# Patient Record
Sex: MALE | Race: WHITE | Employment: OTHER | URBAN - METROPOLITAN AREA
[De-identification: names, ages, dates, MRNs, and addresses within clinical notes are randomized per-mention and may not be internally consistent; named-entity substitution may affect disease eponyms.]

---

## 2018-08-22 PROBLEM — R97.20 ELEVATED PSA MEASUREMENT: Status: ACTIVE | Noted: 2017-10-25

## 2018-08-22 PROBLEM — E78.2 MIXED HYPERLIPIDEMIA: Status: ACTIVE | Noted: 2017-10-25

## 2018-08-24 PROCEDURE — 86803 HEPATITIS C AB TEST: CPT | Performed by: FAMILY MEDICINE

## 2018-08-24 PROCEDURE — 87389 HIV-1 AG W/HIV-1&-2 AB AG IA: CPT | Performed by: FAMILY MEDICINE

## 2018-09-08 PROBLEM — I77.9 ARTERIAL DISEASE (HCC): Status: ACTIVE | Noted: 2018-09-08

## 2018-09-08 PROBLEM — M46.85: Status: ACTIVE | Noted: 2018-09-08

## 2018-09-26 PROBLEM — Z87.898 HISTORY OF SEIZURE: Status: ACTIVE | Noted: 2018-09-26

## 2020-09-18 PROBLEM — M48.8X4: Status: ACTIVE | Noted: 2020-09-18

## 2020-09-18 PROBLEM — M48.8X4: Status: RESOLVED | Noted: 2020-09-18 | Resolved: 2020-09-18

## 2020-09-18 PROBLEM — G31.9 CEREBRAL ATROPHY (HCC): Status: ACTIVE | Noted: 2020-09-18

## 2020-09-21 PROBLEM — M46.85: Status: RESOLVED | Noted: 2018-09-08 | Resolved: 2020-09-21

## 2020-11-15 ENCOUNTER — HOSPITAL ENCOUNTER (INPATIENT)
Facility: HOSPITAL | Age: 80
LOS: 2 days | Discharge: HOME HEALTH | DRG: 683 | End: 2020-11-17
Attending: EMERGENCY MEDICINE | Admitting: HOSPITALIST
Payer: MEDICARE

## 2020-11-15 ENCOUNTER — APPOINTMENT (OUTPATIENT)
Dept: CT IMAGING | Facility: HOSPITAL | Age: 80
DRG: 683 | End: 2020-11-15
Attending: EMERGENCY MEDICINE
Payer: MEDICARE

## 2020-11-15 DIAGNOSIS — N17.9 ACUTE RENAL FAILURE, UNSPECIFIED ACUTE RENAL FAILURE TYPE (HCC): Primary | ICD-10-CM

## 2020-11-15 DIAGNOSIS — N13.9 URINARY OBSTRUCTION: ICD-10-CM

## 2020-11-15 DIAGNOSIS — E87.5 HYPERKALEMIA: ICD-10-CM

## 2020-11-15 PROCEDURE — 99223 1ST HOSP IP/OBS HIGH 75: CPT | Performed by: HOSPITALIST

## 2020-11-15 PROCEDURE — 99223 1ST HOSP IP/OBS HIGH 75: CPT | Performed by: INTERNAL MEDICINE

## 2020-11-15 PROCEDURE — 74176 CT ABD & PELVIS W/O CONTRAST: CPT | Performed by: EMERGENCY MEDICINE

## 2020-11-15 RX ORDER — METOCLOPRAMIDE HYDROCHLORIDE 5 MG/ML
5 INJECTION INTRAMUSCULAR; INTRAVENOUS EVERY 8 HOURS PRN
Status: DISCONTINUED | OUTPATIENT
Start: 2020-11-15 | End: 2020-11-17

## 2020-11-15 RX ORDER — ACETAMINOPHEN 325 MG/1
650 TABLET ORAL EVERY 6 HOURS PRN
Status: DISCONTINUED | OUTPATIENT
Start: 2020-11-15 | End: 2020-11-17

## 2020-11-15 RX ORDER — SODIUM CHLORIDE 9 MG/ML
125 INJECTION, SOLUTION INTRAVENOUS CONTINUOUS
Status: DISCONTINUED | OUTPATIENT
Start: 2020-11-15 | End: 2020-11-15

## 2020-11-15 RX ORDER — SODIUM CHLORIDE 450 MG/100ML
INJECTION, SOLUTION INTRAVENOUS CONTINUOUS
Status: DISCONTINUED | OUTPATIENT
Start: 2020-11-15 | End: 2020-11-17

## 2020-11-15 RX ORDER — DEXTROSE MONOHYDRATE 25 G/50ML
50 INJECTION, SOLUTION INTRAVENOUS ONCE
Status: COMPLETED | OUTPATIENT
Start: 2020-11-15 | End: 2020-11-15

## 2020-11-15 RX ORDER — SODIUM CHLORIDE 9 MG/ML
INJECTION, SOLUTION INTRAVENOUS CONTINUOUS
Status: DISCONTINUED | OUTPATIENT
Start: 2020-11-15 | End: 2020-11-15

## 2020-11-15 RX ORDER — POLYETHYLENE GLYCOL 3350 17 G/17G
17 POWDER, FOR SOLUTION ORAL DAILY PRN
Status: DISCONTINUED | OUTPATIENT
Start: 2020-11-15 | End: 2020-11-17

## 2020-11-15 RX ORDER — ONDANSETRON 2 MG/ML
4 INJECTION INTRAMUSCULAR; INTRAVENOUS EVERY 6 HOURS PRN
Status: DISCONTINUED | OUTPATIENT
Start: 2020-11-15 | End: 2020-11-17

## 2020-11-15 RX ORDER — SODIUM POLYSTYRENE SULFONATE 4.1 MEQ/G
15 POWDER, FOR SUSPENSION ORAL; RECTAL ONCE
Status: COMPLETED | OUTPATIENT
Start: 2020-11-15 | End: 2020-11-15

## 2020-11-15 RX ORDER — TAMSULOSIN HYDROCHLORIDE 0.4 MG/1
0.4 CAPSULE ORAL DAILY
Status: DISCONTINUED | OUTPATIENT
Start: 2020-11-15 | End: 2020-11-17

## 2020-11-15 RX ORDER — HEPARIN SODIUM 5000 [USP'U]/ML
5000 INJECTION, SOLUTION INTRAVENOUS; SUBCUTANEOUS EVERY 12 HOURS SCHEDULED
Status: DISCONTINUED | OUTPATIENT
Start: 2020-11-15 | End: 2020-11-17

## 2020-11-15 NOTE — ED PROVIDER NOTES
Patient Seen in: [facility] Emergency Department      History   Patient presents with:  Constipation  Abdomen/Flank Pain    Stated Complaint: constipation, abdominal pain    HPI    [facility] PEPE is a pleasant 78-year-old male coming with complaints of abdom °C) (Temporal)   Resp 15   Ht 188 cm (6' 2\")   Wt 84.4 kg   SpO2 99%   BMI 23.88 kg/m²         Physical Exam    Alert and oriented patient appears no distress HEENT exam is normal lungs are clear cardiovascular exam shows regular rhythm abdomen soft nonte Sinus Rhythm  Reading: Bifascicular block noted              A total of 35 minutes of critical care time (exclusive of billable procedures) was administered to manage the patient's critical lab values due to his acute renal failure with hyperkalemia.   This

## 2020-11-15 NOTE — CONSULTS
[facility]  Report of Consultation    Matt Moser.  Patient Status:  Inpatient    10/9/1940 MRN [id number]   [facility] 6NE-A Attending Violeta Martinez MD   Hosp Day # 0 PCP Unknown Pcp       Assessment / Plan:    1) NICHOLAS- due to [Penicillin V P*    RASH  Penicillins             RASH    Medications:    Current Facility-Administered Medications:   •  0.9% NaCl infusion, 125 mL/hr, Intravenous, Continuous  •  sodium chloride 0.9% IV bolus 1,000 mL, 1,000 mL, Intravenous, Once  •  [CO Warm and dry, no rashes      Laboratory Data:  Lab Results   Component Value Date    WBC 10.7 11/15/2020    HGB 11.7 11/15/2020    HCT 36.4 11/15/2020    .0 11/15/2020    CREATSERUM 14.30 11/15/2020     11/15/2020     11/15/2020    K 5.

## 2020-11-15 NOTE — ED INITIAL ASSESSMENT (HPI)
Patient here with c/o abdominal pain for a couple times. Patient states he is \"shut down inside. I'm trying to get my body back to normalcy. \"  Patient's last BM 3-4 days ago.

## 2020-11-15 NOTE — H&P
[facility] hospitals[facility]  History and Physical     howsimple.  Patient Status:  Inpatient    10/9/1940 MRN [id number]   [facility] 6NE-A Attending Fredis Snow MD   Hosp Day # 0 PCP Unknown Pcp     Chief Complaint: abdominal pain, co Pancreas cannot digest correctly  Pcn [Penicillin V P*    RASH  Penicillins             RASH    Medications:  No current facility-administered medications on file prior to encounter.      •  Coenzyme Q10 (COQ-10) 100 MG Oral Cap, Take by mouth., Disp: , Rfl results for input(s): PTP, INR in the last 168 hours. No results for input(s): TROP, CK in the last 168 hours. Imaging: Imaging data reviewed in Epic. ASSESSMENT / PLAN:     1. Acute renal failure, due to obstructive uropathy  1.  Luo catheter

## 2020-11-16 PROCEDURE — 99233 SBSQ HOSP IP/OBS HIGH 50: CPT | Performed by: HOSPITALIST

## 2020-11-16 PROCEDURE — 99233 SBSQ HOSP IP/OBS HIGH 50: CPT | Performed by: INTERNAL MEDICINE

## 2020-11-16 NOTE — PLAN OF CARE
Pt received alert/oriented x4, CARROLL, following all commands. Pt denies any SOB. Does c/o \"mild\" abdominal discomfort/tenderness, bloated feeling. Pt states he feels \"much better than earlier today\". Lungs clear bilaterally.  Luo catheter draining clear

## 2020-11-16 NOTE — OCCUPATIONAL THERAPY NOTE
OCCUPATIONAL THERAPY EVALUATION - INPATIENT     Room Number: 6742/7439-V  Evaluation Date: 11/16/2020  Type of Evaluation: Initial  Presenting Problem: abdominal pain, urinary obstruction, NICHOLAS    Physician Order: IP Consult to Occupational Therapy  Reason With: Alone    Toilet and Equipment: Standard height toilet          Occupation/Status: retired     Drives: Yes       Prior Level of Function: lives alone. Independent with all ADL and IADL. No assistive device.   Pt cares for his neighbor who has medical such as brushing teeth?: None  -   Eating meals?: None    AM-PAC Score:  Score: 21  Approx Degree of Impairment: 32.79%  Standardized Score (AM-PAC Scale): 44.27  CMS Modifier (G-Code): CJ    FUNCTIONAL TRANSFER ASSESSMENT  Supine to Sit : Supervision  Sit ability to return safely to his prior level of function.     Patient Complexity  Occupational Profile/Medical History LOW - Brief history including review of medical or therapy records    Specific performance deficits impacting engagement in ADL/IADL LOW  1

## 2020-11-16 NOTE — PLAN OF CARE
Received patient from ER around 1015 am. Patient is alert and oriented, forgetful at times. Able to CARROLL's. Patient on room air, lung sounds diminshed. Patient is in NSR occasional PVC's later in the shift, SBP is WNL.  Pedal pulses palpable, bilateral lower to review patient's medication profile  - Implement strategies to promote bladder emptying  Outcome: Progressing

## 2020-11-16 NOTE — PROGRESS NOTES
[facility]  Nephrology Progress Note    Rainell Console. Attending:  Johana Gilman MD       Assessment and Plan:    1) NICHOLAS- due to obstructive uropathy / urinary retention from BPH vs prostate CA (PSA 6.6)- no other acute insults.  Meds benign; no  11/16/2020    CO2 27.0 11/16/2020     11/16/2020    CA 8.6 11/16/2020    MG 2.0 11/16/2020    PHOS 3.7 11/16/2020       Imaging: All imaging studies reviewed.     Meds:     •  sodium chloride 0.9% IV bolus 1,000 mL, 1,000 mL, Intravenous,

## 2020-11-16 NOTE — PLAN OF CARE
Problem: Impaired Activities of Daily Living  Goal: Achieve highest/safest level of independence in self care  Description: Interventions:  - Assess ability and encourage patient to participate in ADLs to maximize function  - Promote sitting position [facility]

## 2020-11-16 NOTE — CONSULTS
[facility] Urology   Consultation Note    Isak Luna.  Patient Status:  Inpatient    10/9/1940 MRN [id number]   [facility] 6NE-A Attending Luda Black MD   Hosp Day # 1 PCP Unknown Pcp     Reason for Consultation:  NICHOLAS, Diagnosis Date   • BPH (benign prostatic hyperplasia)    • Retention of urine    • Spondylolysis, thoracolumbar region      Past Surgical History:   Procedure Laterality Date   • ADENOIDECTOMY  1950   • APPENDECTOMY  1949   • APPENDECTOMY     • HIP REPLA in no acute distress. HEENT: Unremarkable. NECK: Supple. LUNGS: non-labored respirations. ABDOMEN:  The abdomen is soft and nontender without rebound or guarding. The bladder is non-palpable. BACK: Without CVA tenderness.     GENITOURINARY: The imaging. RETROPERITONEUM:  No mass or adenopathy. BOWEL/MESENTERY:  Limited assessment of the bowel without intravenous or oral contrast.  No frankly dilated loops of small bowel are seen. Much of the bowel is decompressed which limits assessment.   Sm Acute renal failure (HCC)     NICHOLAS (acute kidney injury) ([facility] [address]     Hyperkalemia     Urinary obstruction      NICHOLAS, bilateral hydroureteronephrosis, urinary retention/BPH  -serum creat  -UA does not appear infectious.   +microhematuria in the setting of pacheco

## 2020-11-16 NOTE — CM/SW NOTE
11/16/20 1100   CM/SW Referral Data   Referral Source    Reason for Referral Discharge planning   Informant Patient   Patient Info   Patient's Mental Status Alert;Oriented   Patient's Home Environment Condo/Apt no elevator   Patient lives wi

## 2020-11-16 NOTE — PROGRESS NOTES
[facility]  Progress Note     Yovani Glynn. Patient Status:  Inpatient    10/9/1940 MRN [id number]   [facility] 6NE-A Attending Luiz Rivera MD   Hosp Day # 1 PCP Unknown Pcp     Chief Complaint: discomfort from catheter. (based on SCr of 4.07 mg/dL (H)). No results for input(s): PTP, INR in the last 168 hours. No results for input(s): TROP, CK in the last 168 hours. Imaging: Imaging data reviewed in Epic.     Medications:   • sodium chloride 0.9%  1,000 mL Int

## 2020-11-16 NOTE — HOME CARE LIAISON
Received referral from [facility]. [facility]idential unable to service patient. Will re refer patient. Patient accepted by Steven Fritz. SW updated.     [facility]  [address] [facility] [address]  Phone: [phone]  Fax: [phone]

## 2020-11-16 NOTE — PHYSICAL THERAPY NOTE
PHYSICAL THERAPY EVALUATION - INPATIENT     Room Number: 7550/5594-I  Evaluation Date: 11/16/2020  Type of Evaluation: Initial  Physician Order: PT Eval and Treat    Presenting Problem:  NICHOLAS  Reason for Therapy: Mobility Dysfunction and Discharge Plan WFL - within functional limits    RANGE OF MOTION AND STRENGTH ASSESSMENT  Upper extremity ROM and strength: see OT    Lower extremity ROM is within functional limits     Lower extremity strength is within functional limits     BALANCE  Static Sitting: Goo addressed; Alarm set; Discussed recommendations with /    ASSESSMENT   Patient is a 78year old male admitted on 11/15/2020 for  NICHOLAS and Obstructive uropathy due to obstructive uropathy / urinary retention from BPH vs prostate CA and throughout the session

## 2020-11-16 NOTE — PROGRESS NOTES
NURSING ADMISSION NOTE      Patient admitted via Wheelchair  Oriented to room. Safety precautions initiated. Bed in low position. Call light in reach. Pt arrived from CCU s/p NICHOLAS creatinine 18, creatinine 4.07 today. IVF infusing as ordered.  Fo

## 2020-11-17 VITALS
OXYGEN SATURATION: 98 % | DIASTOLIC BLOOD PRESSURE: 54 MMHG | WEIGHT: 171.06 LBS | HEIGHT: 74 IN | BODY MASS INDEX: 21.95 KG/M2 | TEMPERATURE: 98 F | RESPIRATION RATE: 18 BRPM | HEART RATE: 79 BPM | SYSTOLIC BLOOD PRESSURE: 115 MMHG

## 2020-11-17 PROCEDURE — 99239 HOSP IP/OBS DSCHRG MGMT >30: CPT | Performed by: INTERNAL MEDICINE

## 2020-11-17 PROCEDURE — 99232 SBSQ HOSP IP/OBS MODERATE 35: CPT | Performed by: INTERNAL MEDICINE

## 2020-11-17 RX ORDER — TAMSULOSIN HYDROCHLORIDE 0.4 MG/1
0.4 CAPSULE ORAL DAILY
Qty: 30 CAPSULE | Refills: 0 | Status: SHIPPED | OUTPATIENT
Start: 2020-11-17 | End: 2020-12-17

## 2020-11-17 NOTE — DISCHARGE SUMMARY
[facility]  DISCHARGE SUMMARY     Armando Hudson.  Patient Status:  Inpatient    10/9/1940 MRN [id number]   [facility] 2NE-A Attending Liberty Zuniga # 2 PCP Unknown Pcp     Date of Admission: 11/15/2020  Francis planning and arrange home health care. Patient instructed follow-up PCP in 1 week, transition clinic referral.    Lace+ Score: 47  59-90 High Risk  29-58 Medium Risk  0-28   Low Risk  Patient was referred to the [facility].       TCM F seen and examined independently, agree with above except as otherwise noted.      On Physical Exam:   General: Alert and Oriented x 3, NAD  Lungs: CTAB, no wheezing  Heart: RRR, no murmurs  Abdomen: Soft, NTND  Extremities: No Edema     Assessment and Plan:

## 2020-11-17 NOTE — PLAN OF CARE
Received pt at 0730. Alert and oriented x4. Clear lung sounds, NSR. Pt voiding into pacheco, dark yellow urine with a large blood clot in the pacheco.   Some dried blood noted around groin area bilat and on pacheco catheter itself but no active bleeding that c Problem: GENITOURINARY - ADULT  Goal: Absence of urinary retention  Description: INTERVENTIONS:  - Assess patient’s ability to void and empty bladder  - Monitor intake/output and perform bladder scan as needed  - Follow urinary retention protocol/standar independence in self care  Description: Interventions:  - Assess ability and encourage patient to participate in ADLs to maximize function  - Promote sitting position while performing ADLs such as feeding, grooming, and bathing  - Educate and encourage pat

## 2020-11-17 NOTE — PROGRESS NOTES
Patient seen and examined. Medically stable for discharge. Full discharge summary note to follow. Physical Exam:    General: No acute distress. Respiratory: Clear to auscultation bilaterally. No wheezes. No rhonchi.   Cardiovascular: S1, S2. Regular ra

## 2020-11-17 NOTE — PROGRESS NOTES
[facility]  Nephrology Progress Note    Pasty Stagers. Attending:  Nate Schultz MD       Assessment and Plan:    1) NICHOLAS- due to obstructive uropathy / urinary retention from BPH vs prostate CA (PSA 6.6)- no other acute insults.  Meds benign; no 11/17/2020    GLU 91 11/17/2020    CA 8.0 11/17/2020       Imaging: All imaging studies reviewed.     Meds:     •  sodium chloride 0.9% IV bolus 1,000 mL, 1,000 mL, Intravenous, Once    •  PEG 3350 (MIRALAX) powder packet 17 g, 17 g, Oral, Daily PRN    •

## 2020-11-17 NOTE — PLAN OF CARE
Ok to DC from [employer] and urology. Will DC home with indwelling pacheco and fu with urology. Waiting for daughter to  for DC.

## 2020-11-17 NOTE — PLAN OF CARE
Discharge instructions given regarding pacheco care with [employer]back. Patient able to empty pacheco independently. All questions answered.

## 2020-11-17 NOTE — PLAN OF CARE
Pt is A&Ox4. RA, lungs clear. NSR on tele, denies cardiovascular symptoms. Luo draining clear yellow urine. Continent of bowel, LBM 11/15. Denies pain. Up with SBA. Fall precautions in place. POC updated with pt, he verbalized understanding.  Will mon needed  - Follow urinary retention protocol/standard of care  - Consider collaborating with pharmacy to review patient's medication profile  - Implement strategies to promote bladder emptying  Outcome: Progressing     Problem: METABOLIC/FLUID AND ELECTROLY grooming, and bathing  - Educate and encourage patient/family in tolerated functional activity level and precautions during self-care  Outcome: Progressing

## 2020-11-17 NOTE — PROGRESS NOTES
[facility]  Urology Progress Note    Landon Epstein. Patient Status:  Inpatient    10/9/1940 MRN [id number]   [facility] 2NE-A Attending Donna Yusuf,    Hosp Day # 2 PCP Unknown Pcp     Subjective:  Landon Epstein.  i Urology  11/17/2020  10:00 AM

## 2020-11-17 NOTE — PHYSICAL THERAPY NOTE
PHYSICAL THERAPY TREATMENT NOTE - INPATIENT    Room Number: 2606/2606-A     Session: 1   Number of Visits to Meet Established Goals: 7    Presenting Problem:  NICHOLAS    Problem List  Principal Problem:    [facility]OLAS [facility]) ([facility]  Active Problems:    M None   -   Need to walk in hospital room?: A Little   -   Climbing 3-5 steps with a railing?: A Little       AM-PAC Score:  Raw Score: 22   Approx Degree of Impairment: 20.91%   Standardized Score (AM-PAC Scale): 53.28   CMS Modifier (G-Code): KARLA ROBERTSON modified independent   MET   Goal #2 Patient is able to demonstrate transfers Sit to/from Stand at assistance level: modified independent   MET   Goal #3 Patient is able to ambulate 500 feet with assist device: LRAD at assistance level: independent   sunny

## 2020-11-17 NOTE — PROGRESS NOTES
[facility]  Progress Note     Maryam Xavier. Patient Status:  Inpatient    10/9/1940 MRN [id number]   [facility] 6NE-A Attending Maeve Leos MD   [facility] Day # 2 PCP Unknown Pcp     Chief Complaint: discomfort from catheter.      S: TP 6.9  --   --   --   --     < > = values in this interval not displayed. Estimated Creatinine Clearance: 50.4 mL/min (based on SCr of 1.24 mg/dL). No results for input(s): PTP, INR in the last 168 hours.     No results for input(s): TROP, CK in

## 2020-11-17 NOTE — CM/SW NOTE
Pt accepted by [facility]. Reserved in [address]. Updated pt's discharge summary with their contact information. Will send discharge summary to [facility] once available. Informed Adrianna of discharge today.      Janine Mai Dr  P: [phone]

## 2020-11-18 ENCOUNTER — PATIENT OUTREACH (OUTPATIENT)
Dept: CASE MANAGEMENT | Age: 80
End: 2020-11-18

## 2020-11-18 DIAGNOSIS — Z02.9 ENCOUNTERS FOR UNSPECIFIED ADMINISTRATIVE PURPOSE: ICD-10-CM

## 2020-11-18 DIAGNOSIS — N17.9 AKI (ACUTE KIDNEY INJURY) (HCC): ICD-10-CM

## 2020-11-18 DIAGNOSIS — N13.9 URINARY OBSTRUCTION: ICD-10-CM

## 2020-11-18 DIAGNOSIS — N40.0 BENIGN PROSTATIC HYPERPLASIA, UNSPECIFIED WHETHER LOWER URINARY TRACT SYMPTOMS PRESENT: ICD-10-CM

## 2020-11-18 DIAGNOSIS — E87.5 HYPERKALEMIA: ICD-10-CM

## 2020-11-18 DIAGNOSIS — R97.20 ELEVATED PSA MEASUREMENT: ICD-10-CM

## 2020-11-18 PROCEDURE — 1111F DSCHRG MED/CURRENT MED MERGE: CPT

## 2020-11-18 NOTE — PROGRESS NOTES
Initial Post Discharge Follow Up   Discharge Date: 11/17/20  Contact Date: 11/18/2020    Consent Verification:  Assessment Completed With: Patient  HIPAA Verified? Yes    Discharge Dx:     1. NICHOLAS 2/2 obstructive uropathy, improved  2.  Hyperkalemia due diet per AVS? no      Medications:   Current Outpatient Medications   Medication Sig Dispense Refill   • tamsulosin (FLOMAX) cap Take 1 capsule (0.4 mg total) by mouth daily. 30 capsule 0   • Coenzyme Q10 (COQ-10) 100 MG Oral Cap Take by mouth.      • B Com Yes, NCM confirmed patient has a HFU on 11/20/2020 at 10:30 with the [facility] clinic. Have you made all of your follow up appointments? no    Is there any reason as to why you cannot make your appointments?    No     NCM Reviewed upcoming Specialist Appt wi

## 2020-11-18 NOTE — PLAN OF CARE
Discharge instructions given. Richar peguero discussed. Patient indicated understanding. All questions answered. Daughter waiting in [employer]. NURSING DISCHARGE NOTE    Discharged Home via Wheelchair.   Accompanied by Support staff  Belongings Taken by naveen

## 2020-11-19 ENCOUNTER — OFFICE VISIT (OUTPATIENT)
Dept: INTERNAL MEDICINE CLINIC | Facility: CLINIC | Age: 80
End: 2020-11-19
Payer: MEDICARE

## 2020-11-19 VITALS
DIASTOLIC BLOOD PRESSURE: 68 MMHG | RESPIRATION RATE: 20 BRPM | BODY MASS INDEX: 22.97 KG/M2 | WEIGHT: 179 LBS | HEIGHT: 74 IN | OXYGEN SATURATION: 99 % | TEMPERATURE: 98 F | HEART RATE: 96 BPM | SYSTOLIC BLOOD PRESSURE: 130 MMHG

## 2020-11-19 DIAGNOSIS — N13.9 URINARY OBSTRUCTION: ICD-10-CM

## 2020-11-19 DIAGNOSIS — N17.9 ACUTE RENAL FAILURE, UNSPECIFIED ACUTE RENAL FAILURE TYPE (HCC): Primary | ICD-10-CM

## 2020-11-19 DIAGNOSIS — R97.20 ELEVATED PSA MEASUREMENT: ICD-10-CM

## 2020-11-19 DIAGNOSIS — N13.30 HYDROURETERONEPHROSIS: ICD-10-CM

## 2020-11-19 DIAGNOSIS — N40.0 BENIGN PROSTATIC HYPERPLASIA, UNSPECIFIED WHETHER LOWER URINARY TRACT SYMPTOMS PRESENT: ICD-10-CM

## 2020-11-19 PROCEDURE — 3075F SYST BP GE 130 - 139MM HG: CPT | Performed by: CLINICAL NURSE SPECIALIST

## 2020-11-19 PROCEDURE — 3078F DIAST BP <80 MM HG: CPT | Performed by: CLINICAL NURSE SPECIALIST

## 2020-11-19 PROCEDURE — 3008F BODY MASS INDEX DOCD: CPT | Performed by: CLINICAL NURSE SPECIALIST

## 2020-11-19 PROCEDURE — 99495 TRANSJ CARE MGMT MOD F2F 14D: CPT | Performed by: CLINICAL NURSE SPECIALIST

## 2020-11-19 NOTE — PROGRESS NOTES
TRANSITIONAL CARE CLINIC PHARMACIST MEDICATION RECONCILIATION        Cecilia Raymond. MRN [id number]    10/9/1940 PCP Unknown Pcp       Comments: Medication history completed by the [facility] Pharmacist with the patient in the office.

## 2020-11-19 NOTE — PROGRESS NOTES
[address]  TRANSITIONAL CARE CLINIC      HISTORY   CHIEF COMPLAINT: post hospital follow up visit; acute kidney failure, BPH, bilateral hydroureteronephrosis  HPI: Matt GongMireille is a 78year old male here today for follow up after h OTHER SURGICAL HISTORY  7/31/13    cysto flow us - Dr Camille Hyatt   • OTHER SURGICAL HISTORY      DENTAL   • TONSILLECTOMY  1950    ADENOIDECTOMY      Family History   Problem Relation Age of Onset   • Other (Other) Father         Stroke   • Hypertension Mother 05:18 AM    AST 27 11/15/2020 05:18 AM       Immunization History   Administered Date(s) Administered   • FLU VAC High Dose 65 YRS & Older PRSV Free (98982) 09/21/2020   • Pneumococcal (Prevnar 13) 02/15/2019   • Pneumovax 23 10/01/2016       ROS:  GENERAL Take 1 tablet by mouth daily. , Disp: , Rfl:     •  Cholecalciferol (VITAMIN D-3) 5000 units Oral Tab, Take 1 tablet by mouth daily.   , Disp: , Rfl:       Requested Prescriptions      No prescriptions requested or ordered in this encounter         Health PCP  Your appointments     Date & Time Appointment Department [facility])    Nov 25, 2020  8:00 AM CST New Patient Office Visit with Rhett Terrell MD Urology - [address] [facility] - [address] [place])    As a patient of ours, you should have re

## 2020-11-19 NOTE — PATIENT INSTRUCTIONS
Patient Instructions:  1. We will call you with an appointment time with Dr. Kamron Davis when the office opens tomorrow (11/20/2020).

## 2020-12-03 PROBLEM — N17.9 AKI (ACUTE KIDNEY INJURY) (HCC): Status: RESOLVED | Noted: 2020-11-15 | Resolved: 2020-12-03

## 2020-12-03 PROBLEM — N17.9 ACUTE RENAL FAILURE (HCC): Status: RESOLVED | Noted: 2020-11-15 | Resolved: 2020-12-03

## 2020-12-04 PROBLEM — I70.0 AORTIC ATHEROSCLEROSIS (HCC): Status: ACTIVE | Noted: 2020-12-04

## 2020-12-04 PROBLEM — S32.050A COMPRESSION FRACTURE OF FIFTH LUMBAR VERTEBRA (HCC): Status: ACTIVE | Noted: 2020-12-04

## 2020-12-08 RX ORDER — CALCIUM CARBONATE/VITAMIN D3 600MG-62.5
1 CAPSULE ORAL DAILY
COMMUNITY

## 2020-12-08 RX ORDER — MULTIVITAMIN WITH IRON
250 TABLET ORAL DAILY
Status: ON HOLD | COMMUNITY
End: 2021-01-01

## 2020-12-08 RX ORDER — CHOLECALCIFEROL (VITAMIN D3) 50 MCG
1 TABLET ORAL DAILY
COMMUNITY

## 2020-12-08 NOTE — PAT NURSING NOTE
Called ,surgeon office spoke to Yvrose Pham to advise of contraindication alert regarding antibiotic premedication due corn allergy

## 2020-12-14 ENCOUNTER — ANESTHESIA EVENT (OUTPATIENT)
Dept: SURGERY | Facility: HOSPITAL | Age: 80
End: 2020-12-14
Payer: MEDICARE

## 2020-12-15 ENCOUNTER — ANESTHESIA (OUTPATIENT)
Dept: SURGERY | Facility: HOSPITAL | Age: 80
End: 2020-12-15
Payer: MEDICARE

## 2020-12-15 ENCOUNTER — HOSPITAL ENCOUNTER (OUTPATIENT)
Facility: HOSPITAL | Age: 80
Discharge: HOME | End: 2020-12-16
Attending: UROLOGY | Admitting: UROLOGY
Payer: MEDICARE

## 2020-12-15 DIAGNOSIS — N13.9 URINARY OBSTRUCTION: Primary | ICD-10-CM

## 2020-12-15 DIAGNOSIS — N40.1 BENIGN PROSTATIC HYPERPLASIA WITH URINARY OBSTRUCTION: ICD-10-CM

## 2020-12-15 DIAGNOSIS — N13.8 BENIGN PROSTATIC HYPERPLASIA WITH URINARY OBSTRUCTION: ICD-10-CM

## 2020-12-15 PROCEDURE — 85027 COMPLETE CBC AUTOMATED: CPT | Performed by: UROLOGY

## 2020-12-15 PROCEDURE — 80048 BASIC METABOLIC PNL TOTAL CA: CPT | Performed by: UROLOGY

## 2020-12-15 PROCEDURE — 0VT08ZZ RESECTION OF PROSTATE, VIA NATURAL OR ARTIFICIAL OPENING ENDOSCOPIC: ICD-10-PCS | Performed by: UROLOGY

## 2020-12-15 PROCEDURE — 88305 TISSUE EXAM BY PATHOLOGIST: CPT | Performed by: UROLOGY

## 2020-12-15 RX ORDER — ATROPA BELLADONNA AND OPIUM 16.2; 6 MG/1; MG/1
1 SUPPOSITORY RECTAL EVERY 6 HOURS PRN
Status: DISCONTINUED | OUTPATIENT
Start: 2020-12-15 | End: 2020-12-16

## 2020-12-15 RX ORDER — DOCUSATE SODIUM 100 MG/1
100 CAPSULE, LIQUID FILLED ORAL 2 TIMES DAILY
Status: DISCONTINUED | OUTPATIENT
Start: 2020-12-15 | End: 2020-12-16

## 2020-12-15 RX ORDER — ACETAMINOPHEN 500 MG
1000 TABLET ORAL ONCE AS NEEDED
Status: DISCONTINUED | OUTPATIENT
Start: 2020-12-15 | End: 2020-12-15 | Stop reason: HOSPADM

## 2020-12-15 RX ORDER — ONDANSETRON 2 MG/ML
4 INJECTION INTRAMUSCULAR; INTRAVENOUS AS NEEDED
Status: DISCONTINUED | OUTPATIENT
Start: 2020-12-15 | End: 2020-12-15 | Stop reason: HOSPADM

## 2020-12-15 RX ORDER — FLUCONAZOLE 2 MG/ML
INJECTION, SOLUTION INTRAVENOUS AS NEEDED
Status: DISCONTINUED | OUTPATIENT
Start: 2020-12-15 | End: 2020-12-15 | Stop reason: SURG

## 2020-12-15 RX ORDER — HYDROCODONE BITARTRATE AND ACETAMINOPHEN 5; 325 MG/1; MG/1
1 TABLET ORAL EVERY 4 HOURS PRN
Status: DISCONTINUED | OUTPATIENT
Start: 2020-12-15 | End: 2020-12-16

## 2020-12-15 RX ORDER — NALOXONE HYDROCHLORIDE 0.4 MG/ML
80 INJECTION, SOLUTION INTRAMUSCULAR; INTRAVENOUS; SUBCUTANEOUS AS NEEDED
Status: DISCONTINUED | OUTPATIENT
Start: 2020-12-15 | End: 2020-12-15 | Stop reason: HOSPADM

## 2020-12-15 RX ORDER — HYDROCODONE BITARTRATE AND ACETAMINOPHEN 5; 325 MG/1; MG/1
2 TABLET ORAL EVERY 4 HOURS PRN
Status: DISCONTINUED | OUTPATIENT
Start: 2020-12-15 | End: 2020-12-16

## 2020-12-15 RX ORDER — HYDROMORPHONE HYDROCHLORIDE 1 MG/ML
0.4 INJECTION, SOLUTION INTRAMUSCULAR; INTRAVENOUS; SUBCUTANEOUS EVERY 5 MIN PRN
Status: DISCONTINUED | OUTPATIENT
Start: 2020-12-15 | End: 2020-12-15 | Stop reason: HOSPADM

## 2020-12-15 RX ORDER — DEXAMETHASONE SODIUM PHOSPHATE 4 MG/ML
VIAL (ML) INJECTION AS NEEDED
Status: DISCONTINUED | OUTPATIENT
Start: 2020-12-15 | End: 2020-12-15 | Stop reason: SURG

## 2020-12-15 RX ORDER — HYDROCODONE BITARTRATE AND ACETAMINOPHEN 5; 325 MG/1; MG/1
1 TABLET ORAL AS NEEDED
Status: DISCONTINUED | OUTPATIENT
Start: 2020-12-15 | End: 2020-12-15 | Stop reason: HOSPADM

## 2020-12-15 RX ORDER — POLYETHYLENE GLYCOL 3350 17 G/17G
17 POWDER, FOR SOLUTION ORAL DAILY PRN
Status: DISCONTINUED | OUTPATIENT
Start: 2020-12-15 | End: 2020-12-16

## 2020-12-15 RX ORDER — ACETAMINOPHEN 500 MG
1000 TABLET ORAL ONCE
Status: DISCONTINUED | OUTPATIENT
Start: 2020-12-15 | End: 2020-12-15

## 2020-12-15 RX ORDER — MEPERIDINE HYDROCHLORIDE 25 MG/ML
12.5 INJECTION INTRAMUSCULAR; INTRAVENOUS; SUBCUTANEOUS AS NEEDED
Status: DISCONTINUED | OUTPATIENT
Start: 2020-12-15 | End: 2020-12-15 | Stop reason: HOSPADM

## 2020-12-15 RX ORDER — LIDOCAINE HYDROCHLORIDE 10 MG/ML
INJECTION, SOLUTION EPIDURAL; INFILTRATION; INTRACAUDAL; PERINEURAL AS NEEDED
Status: DISCONTINUED | OUTPATIENT
Start: 2020-12-15 | End: 2020-12-15 | Stop reason: SURG

## 2020-12-15 RX ORDER — ACETAMINOPHEN 325 MG/1
650 TABLET ORAL EVERY 4 HOURS PRN
Status: DISCONTINUED | OUTPATIENT
Start: 2020-12-15 | End: 2020-12-16

## 2020-12-15 RX ORDER — TAMSULOSIN HYDROCHLORIDE 0.4 MG/1
0.4 CAPSULE ORAL DAILY
Status: DISCONTINUED | OUTPATIENT
Start: 2020-12-15 | End: 2020-12-16

## 2020-12-15 RX ORDER — SODIUM CHLORIDE, SODIUM LACTATE, POTASSIUM CHLORIDE, CALCIUM CHLORIDE 600; 310; 30; 20 MG/100ML; MG/100ML; MG/100ML; MG/100ML
INJECTION, SOLUTION INTRAVENOUS CONTINUOUS
Status: DISCONTINUED | OUTPATIENT
Start: 2020-12-15 | End: 2020-12-16

## 2020-12-15 RX ORDER — SODIUM CHLORIDE, SODIUM LACTATE, POTASSIUM CHLORIDE, CALCIUM CHLORIDE 600; 310; 30; 20 MG/100ML; MG/100ML; MG/100ML; MG/100ML
INJECTION, SOLUTION INTRAVENOUS CONTINUOUS
Status: DISCONTINUED | OUTPATIENT
Start: 2020-12-15 | End: 2020-12-15 | Stop reason: SDUPTHER

## 2020-12-15 RX ORDER — HEPARIN SODIUM 5000 [USP'U]/ML
5000 INJECTION, SOLUTION INTRAVENOUS; SUBCUTANEOUS EVERY 8 HOURS SCHEDULED
Status: DISCONTINUED | OUTPATIENT
Start: 2020-12-15 | End: 2020-12-16

## 2020-12-15 RX ORDER — CIPROFLOXACIN 2 MG/ML
400 INJECTION, SOLUTION INTRAVENOUS ONCE
Status: COMPLETED | OUTPATIENT
Start: 2020-12-15 | End: 2020-12-15

## 2020-12-15 RX ORDER — ONDANSETRON 4 MG/1
4 TABLET, ORALLY DISINTEGRATING ORAL EVERY 6 HOURS PRN
Status: DISCONTINUED | OUTPATIENT
Start: 2020-12-15 | End: 2020-12-16

## 2020-12-15 RX ORDER — ONDANSETRON 2 MG/ML
4 INJECTION INTRAMUSCULAR; INTRAVENOUS EVERY 6 HOURS PRN
Status: DISCONTINUED | OUTPATIENT
Start: 2020-12-15 | End: 2020-12-16

## 2020-12-15 RX ORDER — SODIUM CHLORIDE, SODIUM LACTATE, POTASSIUM CHLORIDE, CALCIUM CHLORIDE 600; 310; 30; 20 MG/100ML; MG/100ML; MG/100ML; MG/100ML
INJECTION, SOLUTION INTRAVENOUS CONTINUOUS
Status: DISCONTINUED | OUTPATIENT
Start: 2020-12-15 | End: 2020-12-15 | Stop reason: HOSPADM

## 2020-12-15 RX ORDER — SULFAMETHOXAZOLE AND TRIMETHOPRIM 800; 160 MG/1; MG/1
1 TABLET ORAL 2 TIMES DAILY
Status: DISCONTINUED | OUTPATIENT
Start: 2020-12-15 | End: 2020-12-16

## 2020-12-15 RX ORDER — ONDANSETRON 2 MG/ML
INJECTION INTRAMUSCULAR; INTRAVENOUS AS NEEDED
Status: DISCONTINUED | OUTPATIENT
Start: 2020-12-15 | End: 2020-12-15 | Stop reason: SURG

## 2020-12-15 RX ORDER — HYDROCODONE BITARTRATE AND ACETAMINOPHEN 5; 325 MG/1; MG/1
2 TABLET ORAL AS NEEDED
Status: DISCONTINUED | OUTPATIENT
Start: 2020-12-15 | End: 2020-12-15 | Stop reason: HOSPADM

## 2020-12-15 RX ADMIN — FLUCONAZOLE 200 MG: 2 INJECTION, SOLUTION INTRAVENOUS at 09:15:00

## 2020-12-15 RX ADMIN — SODIUM CHLORIDE, SODIUM LACTATE, POTASSIUM CHLORIDE, CALCIUM CHLORIDE: 600; 310; 30; 20 INJECTION, SOLUTION INTRAVENOUS at 10:06:00

## 2020-12-15 RX ADMIN — LIDOCAINE HYDROCHLORIDE 50 MG: 10 INJECTION, SOLUTION EPIDURAL; INFILTRATION; INTRACAUDAL; PERINEURAL at 09:00:00

## 2020-12-15 RX ADMIN — DEXAMETHASONE SODIUM PHOSPHATE 8 MG: 4 MG/ML VIAL (ML) INJECTION at 09:06:00

## 2020-12-15 RX ADMIN — CIPROFLOXACIN 400 MG: 2 INJECTION, SOLUTION INTRAVENOUS at 09:07:00

## 2020-12-15 RX ADMIN — ONDANSETRON 4 MG: 2 INJECTION INTRAMUSCULAR; INTRAVENOUS at 10:32:00

## 2020-12-15 NOTE — PROGRESS NOTES
PT IS A&O X 4. HE IS ON RA. UP W/SB ASSIST. HE HAS CBI. HAS IV FLUIDS INFUSING. PT DENIES ANY PAIN. HE IS RESTING COMFORTABLY AND HAS CALL LIGHT AT REACH. WILL CONTINUE TO MONITOR.

## 2020-12-15 NOTE — ANESTHESIA PREPROCEDURE EVALUATION
PRE-OP EVALUATION    Patient Name: Robert Oliveros.     Pre-op Diagnosis: Benign prostatic hyperplasia with urinary obstruction [N40.1, N13.8]    Procedure(s):  CYSTOSCOPY, TRANSURETHRAL RESECTION OF PROSTATE    Surgeon(s) and Role:     ELYSE Walsh Date   • ADENOIDECTOMY  1950   • APPENDECTOMY  1949   • APPENDECTOMY     • HIP REPLACEMENT SURGERY  2014    L HORACE   • OTHER SURGICAL HISTORY  7/31/13    cysto flow us - Dr [facility]   • OTHER SURGICAL HISTORY      DENTAL   • [address]

## 2020-12-15 NOTE — ANESTHESIA POSTPROCEDURE EVALUATION
Grecia.  Patient Status:  Outpatient in a Bed   Age/Gender 78year old male MRN [id number]   Location [facility] Attending Will Sanon MD   Hosp Day # 0 PCP Unknown Pcp       Anesthesia Post-op

## 2020-12-15 NOTE — H&P
Urology Pre OP H&P   Encounter Date:  12/15/2020    Chief Complaint:   Urinary Retention. History of Present Illness:   Robin Tanner. is a 78year old male who presents today for evaluation of urinary retention.   Previously seen at [facility] several wee • Coenzyme Q10 (COQ-10) 100 MG Oral Cap Take 1 capsule by mouth daily. • B Complex-Biotin-FA (HM VITAMIN B100 COMPLEX OR) Take 1 tablet by mouth daily. • Cholecalciferol (VITAMIN D-3) 5000 units Oral Tab Take 1 tablet by mouth daily.          So Sodium      136 - 145 mmol/L 140   Potassium      3.5 - 5.1 mmol/L 4.2   Chloride      98 - 112 mmol/L 112   Carbon Dioxide, Total      21.0 - 32.0 mmol/L 25.0   ANION GAP      0 - 18 mmol/L 3   BUN      7 - 18 mg/dL 32 (H)   CREATININE      0.70 - 1.30 mg I have discussed the risks, benefits and alternatives to the proposed procedure/treatment plan with the patient. Our discussion also included the risks and benefits of the alternatives treatment options, including doing nothing.  They were encouraged to as

## 2020-12-15 NOTE — OPERATIVE REPORT
OPERATIVE NOTE    PATIENT NAME: Yovani Glynn.   YOB: 1940  DATE OF SERVICE: 12/15/2020    SURGEON:  Giulia Tinajero MD    ASSISTANT:  None    PREOPERATIVE DIAGNOSIS:  BPH with urinary retention, bilateral hydronephrosis    POSTOPERATIVE DI The patient was brought to the operating room and placed in the supine position on the OR table. SCD's were applied and all pressure points were carefully padded. At this point, anesthesia was successfully induced.   The patient was then given the periope Admit for overnight observation and CBI. Check labs in PACU and tomorrow AM.  He will be discharged to home with his pacheco in place for ~1 week.       Rich Taylor MD  [address]  Department of Urology  Office: [phone]

## 2020-12-15 NOTE — PLAN OF CARE
NURSING ADMISSION NOTE      Patient admitted via BED  Oriented to room. Safety precautions initiated. Bed in low position. Call light in reach. PT TRANSFER FROM PACU IN STABLE CONDTION.     Problem: Patient/Family Goals  Goal: Patient/Family Long devices as appropriate  - Consider OT/PT consult to assist with strengthening/mobility  - Encourage toileting schedule  Outcome: Progressing     Problem: DISCHARGE PLANNING  Goal: Discharge to home or other facility with appropriate resources  Description:

## 2020-12-15 NOTE — ANESTHESIA PROCEDURE NOTES
Airway  Urgency: elective      General Information and Staff    Patient location during procedure: OR  Anesthesiologist: Brunilda Lin MD  Resident/CRNA: Bird Jones CRNA  Performed: CRNA     Indications and Patient Condition  Indicatio

## 2020-12-15 NOTE — CM/SW NOTE
12/15/20 1500   CM/SW Referral Data   Referral Source Social Work (self-referral)   Reason for Referral Discharge planning   Patient Info   Patient's Mental Status Alert;Oriented   Patient's Home Environment Condo/Apt with elevator   Patient lives with

## 2020-12-16 VITALS
HEIGHT: 74 IN | TEMPERATURE: 98 F | WEIGHT: 180.25 LBS | OXYGEN SATURATION: 98 % | RESPIRATION RATE: 16 BRPM | BODY MASS INDEX: 23.13 KG/M2 | SYSTOLIC BLOOD PRESSURE: 105 MMHG | HEART RATE: 61 BPM | DIASTOLIC BLOOD PRESSURE: 50 MMHG

## 2020-12-16 PROCEDURE — 80048 BASIC METABOLIC PNL TOTAL CA: CPT | Performed by: UROLOGY

## 2020-12-16 PROCEDURE — 85027 COMPLETE CBC AUTOMATED: CPT | Performed by: UROLOGY

## 2020-12-16 RX ORDER — CIPROFLOXACIN 500 MG/1
500 TABLET, FILM COATED ORAL EVERY 12 HOURS SCHEDULED
Qty: 10 TABLET | Refills: 0 | Status: SHIPPED | OUTPATIENT
Start: 2020-12-16 | End: 2020-12-21

## 2020-12-16 RX ORDER — CIPROFLOXACIN 500 MG/1
500 TABLET, FILM COATED ORAL EVERY 12 HOURS SCHEDULED
Status: DISCONTINUED | OUTPATIENT
Start: 2020-12-16 | End: 2020-12-16

## 2020-12-16 NOTE — PLAN OF CARE
Problem: Patient/Family Goals  Goal: Patient/Family Long Term Goal  Description: Patient's Long Term Goal: DISCHARGE    Interventions:  - RETURN TO REGULAR ADL'S  -AMBULATE TID  -MONITOR VITALS  -MONITOR I&O'S  - See additional Care Plan goals for specif or other facility with appropriate resources  Description: INTERVENTIONS:  - Identify barriers to discharge w/pt and caregiver  - Include patient/family/discharge partner in discharge planning  - Arrange for needed discharge resources and transportation as

## 2020-12-16 NOTE — PROGRESS NOTES
NURSING DISCHARGE NOTE    Discharged Home via Ambulatory. Accompanied by RN  Belongings Taken by patient/family. PT D/C VIA WHEELCHAIR IN STABLE CONDITION. REVIEWED D/C INSTRUCTIONS AND REMOVED IV. EDUCATED PT ON HALL CARE AND LEG BAG CARE.  EDUCAT

## 2020-12-16 NOTE — PROGRESS NOTES
[facility]  Urology Progress Note    Rainell Console. Patient Status:  Outpatient in a Bed    10/9/1940 MRN [id number]   [facility] 2SW-S Attending Alejandra Zavala MD   Hosp Day # 0 PCP Unknown Pcp     Subjective:  Rainell Console. discussed with nurse and Dr. Arturo Couch.        MOIZ Goodwin-SP  [facility] Urology  12/16/2020  6:58 AM

## 2020-12-16 NOTE — PROGRESS NOTES
Patient seen in follow-up    Patient doing well. Tolerating diet. Ambulating, using IS. Urine blood tinged since CBI clamped this AM.  Urine transparent and no clots.     Plan:  Post-op instructions, restrictions reviewed with patient  Tello Brennan for DC home tod

## 2020-12-31 ENCOUNTER — HOSPITAL ENCOUNTER (INPATIENT)
Facility: HOSPITAL | Age: 80
LOS: 2 days | Discharge: HOME | DRG: 683 | End: 2021-01-02
Attending: EMERGENCY MEDICINE | Admitting: STUDENT IN AN ORGANIZED HEALTH CARE EDUCATION/TRAINING PROGRAM
Payer: MEDICARE

## 2020-12-31 DIAGNOSIS — N13.9 OBSTRUCTIVE UROPATHY: Primary | ICD-10-CM

## 2020-12-31 DIAGNOSIS — N30.00 ACUTE CYSTITIS WITHOUT HEMATURIA: ICD-10-CM

## 2020-12-31 PROBLEM — N17.9 ACUTE KIDNEY INJURY (HCC): Status: ACTIVE | Noted: 2020-12-31

## 2020-12-31 PROBLEM — R73.9 HYPERGLYCEMIA: Status: ACTIVE | Noted: 2020-12-31

## 2020-12-31 PROBLEM — E87.2 METABOLIC ACIDOSIS: Status: ACTIVE | Noted: 2020-12-31

## 2020-12-31 PROBLEM — N17.9 ACUTE RENAL FAILURE (ARF) (HCC): Status: ACTIVE | Noted: 2020-12-31

## 2020-12-31 PROBLEM — E87.1 HYPONATREMIA: Status: ACTIVE | Noted: 2020-12-31

## 2020-12-31 LAB
ALBUMIN SERPL-MCNC: 2.9 G/DL (ref 3.4–5)
ALBUMIN/GLOB SERPL: 0.7 {RATIO} (ref 1–2)
ALP LIVER SERPL-CCNC: 123 U/L
ALT SERPL-CCNC: 24 U/L
ANION GAP SERPL CALC-SCNC: 10 MMOL/L (ref 0–18)
ANION GAP SERPL CALC-SCNC: 8 MMOL/L (ref 0–18)
AST SERPL-CCNC: 20 U/L (ref 15–37)
BILIRUB SERPL-MCNC: 0.7 MG/DL (ref 0.1–2)
BILIRUB UR QL STRIP.AUTO: NEGATIVE
BUN BLD-MCNC: 109 MG/DL (ref 7–18)
BUN BLD-MCNC: 129 MG/DL (ref 7–18)
BUN/CREAT SERPL: 13.7 (ref 10–20)
BUN/CREAT SERPL: 17.3 (ref 10–20)
CALCIUM BLD-MCNC: 8.8 MG/DL (ref 8.5–10.1)
CALCIUM BLD-MCNC: 8.9 MG/DL (ref 8.5–10.1)
CHLORIDE SERPL-SCNC: 105 MMOL/L (ref 98–112)
CHLORIDE SERPL-SCNC: 110 MMOL/L (ref 98–112)
CO2 SERPL-SCNC: 17 MMOL/L (ref 21–32)
CO2 SERPL-SCNC: 22 MMOL/L (ref 21–32)
COLOR UR AUTO: YELLOW
CREAT BLD-MCNC: 6.3 MG/DL
CREAT BLD-MCNC: 9.39 MG/DL
GLOBULIN PLAS-MCNC: 4 G/DL (ref 2.8–4.4)
GLUCOSE BLD-MCNC: 105 MG/DL (ref 70–99)
GLUCOSE BLD-MCNC: 151 MG/DL (ref 70–99)
GLUCOSE UR STRIP.AUTO-MCNC: NEGATIVE MG/DL
KETONES UR STRIP.AUTO-MCNC: NEGATIVE MG/DL
M PROTEIN MFR SERPL ELPH: 6.9 G/DL (ref 6.4–8.2)
NITRITE UR QL STRIP.AUTO: NEGATIVE
OSMOLALITY SERPL CALC.SUM OF ELEC: 316 MOSM/KG (ref 275–295)
OSMOLALITY SERPL CALC.SUM OF ELEC: 327 MOSM/KG (ref 275–295)
PH UR STRIP.AUTO: 6 [PH] (ref 4.5–8)
POTASSIUM SERPL-SCNC: 5.4 MMOL/L (ref 3.5–5.1)
POTASSIUM SERPL-SCNC: 5.7 MMOL/L (ref 3.5–5.1)
PROT UR STRIP.AUTO-MCNC: 30 MG/DL
RBC #/AREA URNS AUTO: >10 /HPF
SARS-COV-2 RNA RESP QL NAA+PROBE: NOT DETECTED
SODIUM SERPL-SCNC: 132 MMOL/L (ref 136–145)
SODIUM SERPL-SCNC: 140 MMOL/L (ref 136–145)
SP GR UR STRIP.AUTO: 1.01 (ref 1–1.03)
UROBILINOGEN UR STRIP.AUTO-MCNC: <2 MG/DL
WBC #/AREA URNS AUTO: >50 /HPF
WBC CLUMPS UR QL AUTO: PRESENT

## 2020-12-31 PROCEDURE — 99223 1ST HOSP IP/OBS HIGH 75: CPT | Performed by: INTERNAL MEDICINE

## 2020-12-31 PROCEDURE — 99223 1ST HOSP IP/OBS HIGH 75: CPT | Performed by: STUDENT IN AN ORGANIZED HEALTH CARE EDUCATION/TRAINING PROGRAM

## 2020-12-31 RX ORDER — POLYETHYLENE GLYCOL 3350 17 G/17G
17 POWDER, FOR SOLUTION ORAL DAILY PRN
Status: DISCONTINUED | OUTPATIENT
Start: 2020-12-31 | End: 2021-01-02

## 2020-12-31 RX ORDER — BISACODYL 10 MG
10 SUPPOSITORY, RECTAL RECTAL
Status: DISCONTINUED | OUTPATIENT
Start: 2020-12-31 | End: 2021-01-02

## 2020-12-31 RX ORDER — ONDANSETRON 2 MG/ML
4 INJECTION INTRAMUSCULAR; INTRAVENOUS EVERY 6 HOURS PRN
Status: DISCONTINUED | OUTPATIENT
Start: 2020-12-31 | End: 2021-01-02

## 2020-12-31 RX ORDER — MELATONIN
3 NIGHTLY PRN
Status: DISCONTINUED | OUTPATIENT
Start: 2020-12-31 | End: 2021-01-02

## 2020-12-31 RX ORDER — SODIUM CHLORIDE 450 MG/100ML
INJECTION, SOLUTION INTRAVENOUS CONTINUOUS
Status: DISCONTINUED | OUTPATIENT
Start: 2020-12-31 | End: 2021-01-01

## 2020-12-31 RX ORDER — ONDANSETRON 2 MG/ML
4 INJECTION INTRAMUSCULAR; INTRAVENOUS EVERY 4 HOURS PRN
Status: DISCONTINUED | OUTPATIENT
Start: 2020-12-31 | End: 2020-12-31 | Stop reason: ALTCHOICE

## 2020-12-31 RX ORDER — SENNOSIDES 8.6 MG
8.6 TABLET ORAL 2 TIMES DAILY
Status: DISCONTINUED | OUTPATIENT
Start: 2020-12-31 | End: 2021-01-02

## 2020-12-31 RX ORDER — METOCLOPRAMIDE HYDROCHLORIDE 5 MG/ML
5 INJECTION INTRAMUSCULAR; INTRAVENOUS EVERY 8 HOURS PRN
Status: DISCONTINUED | OUTPATIENT
Start: 2020-12-31 | End: 2021-01-02

## 2020-12-31 RX ORDER — HYDROMORPHONE HYDROCHLORIDE 1 MG/ML
0.5 INJECTION, SOLUTION INTRAMUSCULAR; INTRAVENOUS; SUBCUTANEOUS EVERY 30 MIN PRN
Status: ACTIVE | OUTPATIENT
Start: 2020-12-31 | End: 2020-12-31

## 2020-12-31 RX ORDER — HEPARIN SODIUM 5000 [USP'U]/ML
5000 INJECTION, SOLUTION INTRAVENOUS; SUBCUTANEOUS EVERY 12 HOURS SCHEDULED
Status: DISCONTINUED | OUTPATIENT
Start: 2020-12-31 | End: 2021-01-02

## 2020-12-31 RX ORDER — ACETAMINOPHEN 325 MG/1
650 TABLET ORAL EVERY 6 HOURS PRN
Status: DISCONTINUED | OUTPATIENT
Start: 2020-12-31 | End: 2021-01-02

## 2020-12-31 NOTE — CONSULTS
[facility]    Report of Consultation    Rainell Console.  Patient Status:  Inpatient    10/9/1940 MRN [id number]   [facility] 4NW-A Attending Latanya Hall MD   Hosp Day # 0 PCP Unknown Pcp     Date of Admission:  2020  Date o HISTORY  7/31/13    cysto flow us - Dr Pam Castillo   • OTHER SURGICAL HISTORY      DENTAL   • TONSILLECTOMY  1950    ADENOIDECTOMY       Family History  Family History   Problem Relation Age of Onset   • Other (Other) Father         Stroke   • Hypertension Mother mouth daily. [place] light     •  Coenzyme Q10 (COQ-10) 100 MG Oral Cap, Take 1 capsule by mouth daily. •  B Complex-Biotin-FA (HM VITAMIN B100 COMPLEX OR), Take 1 tablet by mouth daily.           Allergies    Corn                    OTHER (SEE COMMENT hematuria    S/p TURP on 12/15  Urinary Retention    Recommendations:    -NICHOLAS due to urinary retention: Nephrology following  -CPM with Luo catheter   -UA abnormal, Await final urine culture  -Follow labs, Scr  -IVF given risk for volume depletion with p

## 2020-12-31 NOTE — PLAN OF CARE
NURSING ADMISSION NOTE      Patient admitted via cart from ED. Oriented to room. Safety precautions initiated. Bed in low position. Call light in reach. Pt. A&O x4. VSS. Afebrile. No c/o pain.  Pt. States he feels much better after the pacheco was

## 2020-12-31 NOTE — H&P
[facility]  History and Physical     [employer].  Patient Status:  Emergency    10/9/1940 MRN [id number]   Location [address] [facility] Attending Abhinav Carbajal MD   Hosp Day # 0 PCP Unknown Pcp     Chief Complaint: Re COMMENTS)  Corn-Related Produc*        Comment:Fainting, Pancreas cannot digest correctly  Pcn [Penicillin V P*    RASH  Penicillins             RASH    Medications:  No current facility-administered medications on file prior to encounter.      •  Cholecalc 168 hours.     Invalid input(s): LYM#, MONO#, BASOS#, EOSIN#    Recent Labs   Lab 12/31/20  0845   *   *   CREATSERUM 9.39*   GFRAA 5*   GFRNAA 5*   CA 8.8   ALB 2.9*   *   K 5.7*      CO2 17.0*   ALKPHO 123*   AST 20   ALT 24   BI

## 2020-12-31 NOTE — ED PROVIDER NOTES
Patient Seen in: [facility] Emergency Department      History   Patient presents with:  Constipation  Urinary Symptoms    Stated Complaint: constipation urinary retention abd pain     HPI    78-year-old male who presents here to the emergency depart Drinks per session: 1 or 2      Binge frequency: Never      Comment: RARE SOLO DRINK    Drug use: No      Comment: Ce Hartmann in 1970s             Review of Systems    Positive for stated complaint: constipation urinary retention abd pain   Other systems a (*)     Protein Urine 30  (*)     Leukocyte Esterase Urine Large (*)     WBC Urine >50 (*)     RBC URINE >10 (*)     Bacteria Urine 1+ (*)     Mucous Urine 1+ (*)     WBC Clump Present (*)     All other components within normal limits   RAPID SARS-COV-2 BY Metabolic acidosis U27.2 52/47/6690 Yes

## 2020-12-31 NOTE — CONSULTS
[facility]  Report of Consultation    Armando Hudson.  Patient Status:  Emergency    10/9/1940 MRN [id number]   Location [address] [facility] Attending Augustine Kothari MD   Hosp Day # 0 PCP Unknown Pcp     Reason for Consultation digest correctly  Pcn [Penicillin V P*    RASH  Penicillins             RASH    Medications:  No current facility-administered medications for this encounter. •  Cholecalciferol (VITAMIN D) 50 MCG (2000 UT) Oral Tab, Take 1 capsule by mouth daily.     • cranial nerves grossly intact, moving all extremities  Skin: Warm and dry, no rashes      Laboratory Data:  Lab Results   Component Value Date    CREATSERUM 9.39 12/31/2020     12/31/2020     12/31/2020    K 5.7 12/31/2020     12/31/2020

## 2020-12-31 NOTE — ED INITIAL ASSESSMENT (HPI)
TURP on 12/15   States needs to place pressure on abd to void  Bladder scanner show 1283cc urine in bladder

## 2021-01-01 ENCOUNTER — APPOINTMENT (OUTPATIENT)
Dept: ULTRASOUND IMAGING | Facility: HOSPITAL | Age: 81
DRG: 683 | End: 2021-01-01
Attending: UROLOGY
Payer: MEDICARE

## 2021-01-01 LAB
ANION GAP SERPL CALC-SCNC: 3 MMOL/L (ref 0–18)
BUN BLD-MCNC: 77 MG/DL (ref 7–18)
BUN/CREAT SERPL: 22.1 (ref 10–20)
CALCIUM BLD-MCNC: 8.5 MG/DL (ref 8.5–10.1)
CHLORIDE SERPL-SCNC: 116 MMOL/L (ref 98–112)
CO2 SERPL-SCNC: 24 MMOL/L (ref 21–32)
CREAT BLD-MCNC: 3.48 MG/DL
DEPRECATED RDW RBC AUTO: 45.4 FL (ref 35.1–46.3)
ERYTHROCYTE [DISTWIDTH] IN BLOOD BY AUTOMATED COUNT: 15.2 % (ref 11–15)
GLUCOSE BLD-MCNC: 92 MG/DL (ref 70–99)
HCT VFR BLD AUTO: 28.3 %
HGB BLD-MCNC: 9 G/DL
MCH RBC QN AUTO: 25.9 PG (ref 26–34)
MCHC RBC AUTO-ENTMCNC: 31.8 G/DL (ref 31–37)
MCV RBC AUTO: 81.6 FL
OSMOLALITY SERPL CALC.SUM OF ELEC: 319 MOSM/KG (ref 275–295)
PLATELET # BLD AUTO: 438 10(3)UL (ref 150–450)
POTASSIUM SERPL-SCNC: 4.8 MMOL/L (ref 3.5–5.1)
RBC # BLD AUTO: 3.47 X10(6)UL
SODIUM SERPL-SCNC: 143 MMOL/L (ref 136–145)
WBC # BLD AUTO: 6.2 X10(3) UL (ref 4–11)

## 2021-01-01 PROCEDURE — 99232 SBSQ HOSP IP/OBS MODERATE 35: CPT | Performed by: STUDENT IN AN ORGANIZED HEALTH CARE EDUCATION/TRAINING PROGRAM

## 2021-01-01 PROCEDURE — 76770 US EXAM ABDO BACK WALL COMP: CPT | Performed by: UROLOGY

## 2021-01-01 PROCEDURE — 99233 SBSQ HOSP IP/OBS HIGH 50: CPT | Performed by: INTERNAL MEDICINE

## 2021-01-01 RX ORDER — CEPHALEXIN 500 MG/1
500 CAPSULE ORAL EVERY 12 HOURS
Qty: 10 CAPSULE | Refills: 0 | Status: SHIPPED | OUTPATIENT
Start: 2021-01-01 | End: 2021-01-06

## 2021-01-01 RX ORDER — MULTIVITAMIN WITH IRON
250 TABLET ORAL DAILY
Qty: 30 TABLET | Refills: 0 | Status: SHIPPED | OUTPATIENT
Start: 2021-01-01 | End: 2021-11-12

## 2021-01-01 NOTE — PROGRESS NOTES
[facility]  Progress Note     Liza Moore.  Patient Status:  Inpatient    10/9/1940 MRN [id number]   [facility] 4NW-A Attending Alena Singletary MD   Hosp Day # 1 PCP Unknown Pcp     Chief Complaint: Urine retention     S: Patie reviewed in Epic. Medications:   • Heparin Sodium (Porcine)  5,000 Units Subcutaneous Q12H [address]   • Senna  8.6 mg Oral BID   • cefTRIAXone  1 g Intravenous Q24H       ASSESSMENT / PLAN:     1. NICHOLAS due to urinary retention  1.  S/p recent cysto/TURP 12/15,

## 2021-01-01 NOTE — PROGRESS NOTES
[facility]  Nephrology Progress Note    Matt Gong.  Patient Status:  Inpatient    10/9/1940 MRN [id number]   [facility] 4NW-A Attending Gustavo Cabrera MD   Hosp Day # 1 PCP Unknown Pcp       SUBJECTIVE:  Stable this afternoon, Sodium (Porcine) 5000 UNIT/ML injection 5,000 Units, 5,000 Units, Subcutaneous, Q12H [address]    •  acetaminophen (TYLENOL) tab 650 mg, 650 mg, Oral, Q6H PRN    •  melatonin tab 3 mg, 3 mg, Oral, Nightly PRN    •  ondansetron HCl (ZOFRAN) injection 4 mg, 4 mg,

## 2021-01-01 NOTE — PLAN OF CARE
Pt AOx3. VSS. [place]. IVF d/c'd by nephrology. US ordered by urology. Per urology, would like to have pt watched off IVF and watch labs. D/C delayed. Pt understands POC. Luo draining clear yellow urine. Up in room. Good appetite.

## 2021-01-01 NOTE — PROGRESS NOTES
[facility]    Progress Note    Laurent.  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 The appearance is   suggestive of bladder outlet obstruction. Age-indeterminate compression fractures of the thoracolumbar spine including T12 and L5. These may be chronic.   Correlation for s

## 2021-01-01 NOTE — PLAN OF CARE
Patient stated he was been constipated  for several days, started on stool softener today with good result. Luo  with large amount of clear yellow output today, denies further abd pain, afebrile.   Repeat K this pm of 5.4  from 5.7  text paged  to nephrol

## 2021-01-02 VITALS
BODY MASS INDEX: 22 KG/M2 | TEMPERATURE: 98 F | OXYGEN SATURATION: 98 % | DIASTOLIC BLOOD PRESSURE: 51 MMHG | HEART RATE: 60 BPM | RESPIRATION RATE: 16 BRPM | SYSTOLIC BLOOD PRESSURE: 116 MMHG | WEIGHT: 167.88 LBS

## 2021-01-02 LAB
ANION GAP SERPL CALC-SCNC: 3 MMOL/L (ref 0–18)
BUN BLD-MCNC: 43 MG/DL (ref 7–18)
BUN/CREAT SERPL: 22.5 (ref 10–20)
CALCIUM BLD-MCNC: 8.9 MG/DL (ref 8.5–10.1)
CHLORIDE SERPL-SCNC: 115 MMOL/L (ref 98–112)
CO2 SERPL-SCNC: 26 MMOL/L (ref 21–32)
CREAT BLD-MCNC: 1.91 MG/DL
GLUCOSE BLD-MCNC: 96 MG/DL (ref 70–99)
OSMOLALITY SERPL CALC.SUM OF ELEC: 309 MOSM/KG (ref 275–295)
POTASSIUM SERPL-SCNC: 4.6 MMOL/L (ref 3.5–5.1)
SODIUM SERPL-SCNC: 144 MMOL/L (ref 136–145)

## 2021-01-02 PROCEDURE — 99232 SBSQ HOSP IP/OBS MODERATE 35: CPT | Performed by: INTERNAL MEDICINE

## 2021-01-02 PROCEDURE — 99239 HOSP IP/OBS DSCHRG MGMT >30: CPT | Performed by: INTERNAL MEDICINE

## 2021-01-02 NOTE — PHYSICAL THERAPY NOTE
PHYSICAL THERAPY QUICK EVALUATION - INPATIENT    Room Number: 415/415-A  Evaluation Date: 1/2/2021  Presenting Problem: Obstructive uropathy  Physician Order: PT Eval and Treat    Problem List  Principal Problem:    Obstructive uropathy  Active Problems: limits     Lower extremity ROM is within functional limits     Lower extremity strength is within functional limits       ACTIVITY TOLERANCE: Good      O2 WALK        AM-PAC '6-Clicks' INPATIENT SHORT FORM - BASIC MOBILITY  How much difficulty does the pat several times with staff in the hallways while in [place] [id number] and educated on it' rationale. Based on this evaluation, patient's clinical presentation is stable and overall evaluation complexity is considered moderate. Nursing is aware ofd this visit.  D/c as radha

## 2021-01-02 NOTE — PROGRESS NOTES
[facility]  Progress Note     Huan Reyna.  Patient Status:  Inpatient    10/9/1940 MRN [id number]   [facility] 4NW-A Attending Jorge A Machado MD   Hosp Day # 2 PCP Unknown Pcp     Chief Complaint: Urine retention     S: still Imaging: Imaging data reviewed in Epic. Medications:   • Heparin Sodium (Porcine)  5,000 Units Subcutaneous Q12H [address]   • Senna  8.6 mg Oral BID   • cefTRIAXone  1 g Intravenous Q24H       ASSESSMENT / PLAN:     1.  NICHOLAS due to urinary retention with p

## 2021-01-02 NOTE — CM/SW NOTE
01/02/21 1600   Discharge disposition   Expected discharge disposition Home or Self   Discharge transportation Private car

## 2021-01-02 NOTE — PROGRESS NOTES
Pt AOX4 with no complaints of pain. Pt with 2300mL urine output in pacheco overnight. Pt with BM before bed. Pt requested melatonin and it was given. Pt reports good appetite all day. Ambulating in room to and from bathroom. Needs met at this time. VSS.  Meds

## 2021-01-02 NOTE — DISCHARGE SUMMARY
[facility]  DISCHARGE SUMMARY     Truong Schreiber.  Patient Status:  Inpatient    10/9/1940 MRN [id number]   [facility] 4NW-A Attending Emily Lai, [facility] Day # 2 PCP Caty Bellamy MD     Date of Admission: 2020 cephALEXin 500 MG Caps  Commonly known as: KEFLEX      Take 1 capsule (500 mg total) by mouth Q12H for 5 days.    Stop taking on: January 6, 2021  Quantity: 10 capsule  Refills: 0        CONTINUE taking these medications      Instructions Prescription det edema.  -----------------------------------------------------------------------------------------------  PATIENT DISCHARGE INSTRUCTIONS: See electronic chart    Jewel Apple DO    Time spent:  > 30 minutes

## 2021-01-02 NOTE — PROGRESS NOTES
Grecia. Patient Status:  Inpatient    10/9/1940 MRN [id number]   [facility] 4NW-A Attending Negrita Sahni Day # 2 PCP Unknown Pcp     Subjective:   Interval History: denies abdominal pain, kacy appears improvement compared with a CT scan from November 2020.              Assessment/Plan:  Principal Problem:    Obstructive uropathy  Active Problems:    NICHOLAS (acute kidney injury) ([address]    Hyponatremia    Acute renal failure (ARF) (HCC)    Acute kidney

## 2021-01-02 NOTE — PROGRESS NOTES
[facility]  Nephrology Progress Note    Triny Cobos.  Patient Status:  Inpatient    10/9/1940 MRN [id number]   [facility] 4NW-A Attending Casandra Tucker MD   Hosp Day # 2 PCP Yann Hackett MD       SUBJECTIVE:  Stable this aftern AST 20   ALB 2.9*       No results for input(s): PGLU in the last 168 hours.     Meds:     •  Heparin Sodium (Porcine) 5000 UNIT/ML injection 5,000 Units, 5,000 Units, Subcutaneous, Q12H [address]    •  acetaminophen (TYLENOL) tab 650 mg, 650 mg, Oral, Q6H PRN

## 2021-01-03 NOTE — PROGRESS NOTES
Dc patient in stable condition,Salin lock removed,Dc instruction given,Home w/ pacheco cath. Instructed on follow up appointment w/ his Md, Verbalized needs,Daughter Shamika Code was notified of discharge since patient lives alone.

## 2021-01-03 NOTE — PROGRESS NOTES
On continued iv abt,Denies any pain or discomfort,Has good appetite,Seen by hospitalist and consulting Md,Pacheco cath to gravity w/ still a lot of output, Plan for dc today w/ pacheco cath.

## 2021-04-02 ENCOUNTER — IMMUNIZATION (OUTPATIENT)
Dept: LAB | Facility: HOSPITAL | Age: 81
End: 2021-04-02
Attending: HOSPITALIST
Payer: MEDICARE

## 2021-04-02 DIAGNOSIS — Z23 NEED FOR VACCINATION: Primary | ICD-10-CM

## 2021-04-02 PROCEDURE — 0011A SARSCOV2 VAC 100MCG/0.5ML IM: CPT

## 2021-04-30 ENCOUNTER — IMMUNIZATION (OUTPATIENT)
Dept: LAB | Facility: HOSPITAL | Age: 81
End: 2021-04-30
Attending: EMERGENCY MEDICINE
Payer: MEDICARE

## 2021-04-30 DIAGNOSIS — Z23 NEED FOR VACCINATION: Primary | ICD-10-CM

## 2021-04-30 PROCEDURE — 0012A SARSCOV2 VAC 100MCG/0.5ML IM: CPT | Performed by: PHYSICIAN ASSISTANT

## 2021-10-29 PROBLEM — N18.32 STAGE 3B CHRONIC KIDNEY DISEASE (HCC): Status: ACTIVE | Noted: 2021-10-29

## 2021-11-12 PROBLEM — E87.5 HYPERKALEMIA: Status: RESOLVED | Noted: 2020-11-15 | Resolved: 2021-11-12

## 2021-11-12 PROBLEM — E87.1 HYPONATREMIA: Status: RESOLVED | Noted: 2020-12-31 | Resolved: 2021-11-12

## 2021-11-12 PROBLEM — N30.00 ACUTE CYSTITIS WITHOUT HEMATURIA: Status: RESOLVED | Noted: 2020-12-31 | Resolved: 2021-11-12

## 2021-11-12 PROBLEM — S32.050A COMPRESSION FRACTURE OF FIFTH LUMBAR VERTEBRA (HCC): Status: RESOLVED | Noted: 2020-12-04 | Resolved: 2021-11-12
